# Patient Record
Sex: FEMALE | Race: WHITE | NOT HISPANIC OR LATINO | Employment: FULL TIME | URBAN - METROPOLITAN AREA
[De-identification: names, ages, dates, MRNs, and addresses within clinical notes are randomized per-mention and may not be internally consistent; named-entity substitution may affect disease eponyms.]

---

## 2017-05-11 ENCOUNTER — ALLSCRIPTS OFFICE VISIT (OUTPATIENT)
Dept: OTHER | Facility: OTHER | Age: 47
End: 2017-05-11

## 2017-05-11 ENCOUNTER — LAB REQUISITION (OUTPATIENT)
Dept: LAB | Facility: HOSPITAL | Age: 47
End: 2017-05-11
Payer: COMMERCIAL

## 2017-05-11 DIAGNOSIS — Z12.4 ENCOUNTER FOR SCREENING FOR MALIGNANT NEOPLASM OF CERVIX: ICD-10-CM

## 2017-05-11 DIAGNOSIS — Z12.31 ENCOUNTER FOR SCREENING MAMMOGRAM FOR MALIGNANT NEOPLASM OF BREAST: ICD-10-CM

## 2017-05-11 PROCEDURE — G0145 SCR C/V CYTO,THINLAYER,RESCR: HCPCS | Performed by: OBSTETRICS & GYNECOLOGY

## 2017-05-11 PROCEDURE — 87624 HPV HI-RISK TYP POOLED RSLT: CPT | Performed by: OBSTETRICS & GYNECOLOGY

## 2017-05-16 LAB — HPV RRNA GENITAL QL NAA+PROBE: NORMAL

## 2017-05-18 LAB
LAB AP GYN PRIMARY INTERPRETATION: NORMAL
Lab: NORMAL

## 2017-06-15 ENCOUNTER — HOSPITAL ENCOUNTER (OUTPATIENT)
Dept: MAMMOGRAPHY | Facility: MEDICAL CENTER | Age: 47
Discharge: HOME | End: 2017-06-15
Payer: COMMERCIAL

## 2017-06-15 DIAGNOSIS — Z12.31 ENCOUNTER FOR SCREENING MAMMOGRAM FOR MALIGNANT NEOPLASM OF BREAST: ICD-10-CM

## 2017-06-15 PROCEDURE — 77063 BREAST TOMOSYNTHESIS BI: CPT

## 2017-06-15 PROCEDURE — G0202 SCR MAMMO BI INCL CAD: HCPCS

## 2018-01-12 VITALS
HEIGHT: 62 IN | BODY MASS INDEX: 30.8 KG/M2 | SYSTOLIC BLOOD PRESSURE: 120 MMHG | WEIGHT: 167.38 LBS | DIASTOLIC BLOOD PRESSURE: 72 MMHG

## 2018-03-23 ENCOUNTER — TELEPHONE (OUTPATIENT)
Dept: OBGYN CLINIC | Facility: CLINIC | Age: 48
End: 2018-03-23

## 2018-03-23 DIAGNOSIS — E34.9 NON-MENOPAUSE HORMONAL DISORDER IN FEMALE: Primary | ICD-10-CM

## 2018-03-23 NOTE — TELEPHONE ENCOUNTER
Patient called to say that she has been really emotional lately  She is experiencing hot flashes and other menopausal symptoms  She is currently on her 2rd Mirena  She wants to know if there is blood work, etc she needs  She has her yearly exam scheduled in May

## 2019-02-26 DIAGNOSIS — N76.0 BACTERIAL VAGINOSIS: Primary | ICD-10-CM

## 2019-02-26 DIAGNOSIS — B96.89 BACTERIAL VAGINOSIS: Primary | ICD-10-CM

## 2019-02-26 RX ORDER — CLINDAMYCIN HYDROCHLORIDE 300 MG/1
300 CAPSULE ORAL 2 TIMES DAILY
Qty: 14 CAPSULE | Refills: 0 | Status: SHIPPED | OUTPATIENT
Start: 2019-02-26 | End: 2019-03-05

## 2019-02-26 NOTE — TELEPHONE ENCOUNTER
Patient called c/o BV, has had before  Her appointment was rescheduled by office until mid March  Patient is leaving to go on vacation and needs Rx now  She prefers oral Rx with no alcohol contraindication  Per Dr Donald Mejia sent for Clindamycin 300 mg tablets

## 2019-03-18 ENCOUNTER — ANNUAL EXAM (OUTPATIENT)
Dept: OBGYN CLINIC | Facility: CLINIC | Age: 49
End: 2019-03-18
Payer: COMMERCIAL

## 2019-03-18 VITALS
DIASTOLIC BLOOD PRESSURE: 84 MMHG | BODY MASS INDEX: 32.02 KG/M2 | HEIGHT: 62 IN | SYSTOLIC BLOOD PRESSURE: 112 MMHG | WEIGHT: 174 LBS

## 2019-03-18 DIAGNOSIS — N91.4 SECONDARY OLIGOMENORRHEA: ICD-10-CM

## 2019-03-18 DIAGNOSIS — Z12.31 ENCOUNTER FOR SCREENING MAMMOGRAM FOR MALIGNANT NEOPLASM OF BREAST: Primary | ICD-10-CM

## 2019-03-18 DIAGNOSIS — Z97.5 IUD (INTRAUTERINE DEVICE) IN PLACE: ICD-10-CM

## 2019-03-18 PROCEDURE — 99396 PREV VISIT EST AGE 40-64: CPT | Performed by: OBSTETRICS & GYNECOLOGY

## 2019-03-18 RX ORDER — POLYETHYLENE GLYCOL 3350 17 G/17G
POWDER, FOR SOLUTION ORAL
COMMUNITY
Start: 2014-02-12

## 2019-03-18 NOTE — PATIENT INSTRUCTIONS
Intrauterine Device   WHAT YOU NEED TO KNOW:   What is an intrauterine device? An intrauterine device (IUD) is a type of birth control that is inserted into your uterus  It is a small, flexible piece of plastic with a string on the end  It is inserted and removed by your healthcare provider  IUDs prevent sperm from reaching or fertilizing an egg  IUDs also prevent a fertilized egg from attaching to the uterus and developing into a fetus  What are the most common types of IUDs? · Copper: This type of IUD slowly releases a small amount of copper into your uterus  This IUD can remain in place for up to 10 years  · Hormone-releasing: This type of IUD slowly releases a small amount of progesterone into your uterus  Progesterone is a hormone that is made by your body to help control your periods  This IUD can remain in place for up to 5 years  What are the advantages of an IUD? · Effective: An IUD is 98% to 99% effective in preventing pregnancy  · Easily removable: The IUD can be removed if you decide to have a baby  You may be able to get pregnant as soon as the IUD is removed  · Immediate:  An IUD protects you from pregnancy right after it is inserted  · Convenient:  You do not have to stop sexual activity to insert it or worry about remembering to take your birth control pill  · Safe:  Copper IUDs are safer for some women than oral birth control pills  Examples include women who smoke or have a history of blood clots  · Health effects:  Hormone-releasing IUDs may decrease certain health problems  Examples include bleeding and cramping that happen with your monthly period  What are the risks of an IUD? · An IUD does not protect you from sexually transmitted infections  You may have cramps during the first weeks after you get the IUD  A copper IUD may cause your monthly period to be heavier or more painful  This is more common within the first 3 months after you get the IUD   You may need to have your IUD removed if your bleeding or pain becomes severe  You may have spotting between periods  · There is a small chance that you could get pregnant  Sometimes the IUD cannot be removed after you get pregnant  This increases your risk of a miscarriage or an ectopic pregnancy  Ectopic pregnancy is when the fertilized egg starts to grow somewhere other than your uterus  There is a small risk of an infection within the first 20 days after the IUD is placed  Infection can lead to pelvic inflammatory disease  This can cause infertility  Your uterus may tear when the IUD is inserted  The IUD may slip part or all of the way out of your uterus  How is the IUD inserted? · The IUD is usually inserted during your monthly period  This may help decrease the amount of discomfort you have during the procedure  It also helps ensure that you are not pregnant  You will be asked to lie down and place your feet in stirrups  Your healthcare provider will gently insert a speculum into your vagina  This is the same tool used during a pap smear  The speculum allows your healthcare provider to see inside your vagina to your cervix  The cervix is the opening of your uterus  · Your healthcare provider will clean your cervix with an antiseptic solution to prevent infection  You may be given numbing medicine  A long plastic tube is gently passed through your cervix and into your uterus  This tube has the IUD inside of it  The IUD is pushed out of the tube and into your uterus  You may have cramps as the IUD is inserted  The tube is removed after the IUD is in place  How can I make sure my IUD is still in place? An IUD has a string made of plastic thread  One to 2 inches of this string hangs into your vagina  You cannot see this string, and it will not cause problems when you have sex  Check your IUD string every 3 days for the first 3 months after it is inserted  After that, check the string after each monthly period   Do the following to check the placement of your IUD:  · Wash your hands with soap and warm water  Dry them with a clean towel  · Bend your knees and squat low to the ground  · Gently put your index finger inside your vagina  The cervix is at the top of the vagina and feels like the tip of your nose  Feel for the IUD string  Do not pull on the string  You should not be able to feel the firm plastic of the IUD itself  · Wash your hands after you check your IUD string  Where can I find more information? · [facility] [address] [place]  Phone: 0- [phone]  Web Address: https://[employer]/  org  When should I contact my healthcare provider? · You think you are pregnant  · You bleed after you have sex  · You have pain during sex  · You cannot feel the IUD string, the string feels longer, or you feel the plastic of the IUD itself  · You have vaginal discharge that is green, yellow, or has a foul odor  · You have questions or concerns about your condition or care  When should I seek immediate care? · You have severe pain or bleeding during your period  · You have a fever and severe abdominal pain  CARE AGREEMENT:   You have the right to help plan your care  Learn about your health condition and how it may be treated  Discuss treatment options with your caregivers to decide what care you want to receive  You always have the right to refuse treatment  The above information is an  only  It is not intended as medical advice for individual conditions or treatments  Talk to your doctor, nurse or pharmacist before following any medical regimen to see if it is safe and effective for you  © 2017 [employer] Information is for End User's use only and may not be sold, redistributed or otherwise used for commercial purposes   All illustrations and images included in CareNotes® are the copyrighted property of A D A M , Inc  or Orion Singh

## 2019-03-18 NOTE — PROGRESS NOTES
Assessment/Plan   Diagnoses and all orders for this visit:    Encounter for screening mammogram for malignant neoplasm of breast  -     Mammo screening bilateral w 3d & cad; Future    Other orders  -     polyethylene glycol (MIRALAX) powder; Take by mouth  -     levonorgestrel (MIRENA) 20 MCG/24HR IUD; by Intrauterine route     1  Yearly exam-Pap smear deferred, self-breast awareness reviewed, calcium/vitamin-D recommendations discussed, mammogram request given  2  Mirena IUD-status post removal with reinsertion 1/7/15  Patient is due for removal January 2020  We discussed contraceptive options  She was given the contraceptive options sheet  She is leaning towards repeat IUD at that time  She will call December 2019 and let us know which way she wishes to go  3  History of right ovarian cyst-resolved on most recent ultrasound a few years back  Exam is negative today  4  History of positive HPV-noted from Pap May 2013 with negative Pap at that time  Pap with HPV November 2014 was negative and Pap/HPV negative May 2017  Pap was deferred today as per current guidelines with patient agreement  5   History of asymmetric right breast-stable on mammogram imaging  Most recent 3D mammogram June 2017 was negative  Request for 3D mammogram was given today  She will follow-up January 2020 for IUD removal and reinsertion if she wishes to proceed in that fashion  Otherwise, follow-up 1 year for yearly exam     Subjective   Patient ID: Asaf Johnston is a 57 y o  female  Vitals:    03/18/19 0808   BP: 112/84     Patient was seen today for yearly exam   Please see assessment plan for details        The following portions of the patient's history were reviewed and updated as appropriate: allergies, current medications, past family history, past medical history, past social history, past surgical history and problem list   Past Medical History:   Diagnosis Date    Blood type, Rh negative     Fatty infiltration of liver  Human papilloma virus infection 2013    Pap negative with positive HPV     Past Surgical History:   Procedure Laterality Date     SECTION, LOW TRANSVERSE  2004    Breech presentation    OVARIAN CYST REMOVAL      REDUCTION MAMMAPLASTY      TONSILLECTOMY      TOOTH EXTRACTION       OB History    Para Term  AB Living   2 1 1   1 1   SAB TAB Ectopic Multiple Live Births           1      # Outcome Date GA Lbr Juan/2nd Weight Sex Delivery Anes PTL Lv   2 AB            1 Term               Obstetric Comments   Breech birth   Spontaneous        Current Outpatient Medications:     levonorgestrel (MIRENA) 20 MCG/24HR IUD, by Intrauterine route, Disp: , Rfl:     polyethylene glycol (MIRALAX) powder, Take by mouth, Disp: , Rfl:   No Known Allergies  Social History     Socioeconomic History    Marital status:      Spouse name: None    Number of children: 1    Years of education: None    Highest education level: None   Occupational History    None   Social Needs    Financial resource strain: None    Food insecurity:     Worry: None     Inability: None    Transportation needs:     Medical: None     Non-medical: None   Tobacco Use    Smoking status: Never Smoker    Smokeless tobacco: Never Used   Substance and Sexual Activity    Alcohol use: Yes     Comment: Socially    Drug use: No    Sexual activity: Yes     Birth control/protection: IUD   Lifestyle    Physical activity:     Days per week: None     Minutes per session: None    Stress: None   Relationships    Social connections:     Talks on phone: None     Gets together: None     Attends Confucianism service: None     Active member of club or organization: None     Attends meetings of clubs or organizations: None     Relationship status: None    Intimate partner violence:     Fear of current or ex partner: None     Emotionally abused: None     Physically abused: None     Forced sexual activity: None Other Topics Concern    None   Social History Narrative    Caffeine use    [employer] affiliation:  [employer]    Uses safety equipment - seatbelts     Family History   Problem Relation Age of Onset    Diabetes Father     Hypertension Father     Prostate cancer Family     Heart disease Family     Uterine cancer Maternal Grandmother        Review of Systems   Constitutional: Negative for chills, diaphoresis, fatigue and fever  Respiratory: Negative for apnea, cough, chest tightness, shortness of breath and wheezing  Cardiovascular: Negative for chest pain, palpitations and leg swelling  Gastrointestinal: Negative for abdominal distention, abdominal pain, anal bleeding, constipation, diarrhea, nausea, rectal pain and vomiting  Genitourinary: Negative for difficulty urinating, dyspareunia, dysuria, frequency, hematuria, menstrual problem, pelvic pain, urgency, vaginal bleeding, vaginal discharge and vaginal pain  Musculoskeletal: Negative for arthralgias, back pain and myalgias  Skin: Negative for color change and rash  Neurological: Negative for dizziness, syncope, light-headedness, numbness and headaches  Hematological: Negative for adenopathy  Does not bruise/bleed easily  Psychiatric/Behavioral: Negative for dysphoric mood and sleep disturbance  The patient is not nervous/anxious  Objective   Physical Exam    Objective      /84 (BP Location: Right arm, Patient Position: Sitting, Cuff Size: Standard)   Ht 5' 2" (1 575 m)   Wt 78 9 kg (174 lb)   BMI 31 83 kg/m²     General:   alert and oriented, in no acute distress   Neck: normal to inspection and palpation   Breast: normal appearance, no masses or tenderness   Heart:    Lungs:    Abdomen: soft, non-tender, without masses or organomegaly   Vulva: normal   Vagina: Without erythema or lesions or discharge  Normal   Cervix: Without lesions or discharge or cervicitis    No Cervical motion tenderness   Uterus: top normal size, mid-position, non-tender   Adnexa: no mass, fullness, tenderness   Rectum: negative    Psych:  Normal mood and affect   Skin:  Without obvious lesions   Eyes: symmetric, with normal movements and reactivity   Musculoskeletal:  Normal muscle tone and movements appreciated

## 2019-04-04 ENCOUNTER — TELEPHONE (OUTPATIENT)
Dept: OBGYN CLINIC | Facility: CLINIC | Age: 49
End: 2019-04-04

## 2019-05-20 ENCOUNTER — HOSPITAL ENCOUNTER (OUTPATIENT)
Dept: MAMMOGRAPHY | Facility: MEDICAL CENTER | Age: 49
Discharge: HOME | End: 2019-05-20
Payer: COMMERCIAL

## 2019-05-20 VITALS — BODY MASS INDEX: 32.02 KG/M2 | WEIGHT: 174 LBS | HEIGHT: 62 IN

## 2019-05-20 DIAGNOSIS — Z12.31 ENCOUNTER FOR SCREENING MAMMOGRAM FOR MALIGNANT NEOPLASM OF BREAST: ICD-10-CM

## 2019-05-20 PROCEDURE — 77067 SCR MAMMO BI INCL CAD: CPT

## 2019-05-20 PROCEDURE — 77063 BREAST TOMOSYNTHESIS BI: CPT

## 2019-11-01 ENCOUNTER — TELEPHONE (OUTPATIENT)
Dept: OBGYN CLINIC | Facility: CLINIC | Age: 49
End: 2019-11-01

## 2019-11-01 NOTE — TELEPHONE ENCOUNTER
Agree, Toradol as a good idea  Toradol 10 mg tablets, #2 to take p o  X1 about 1-2 hours prior to the procedure with repeat in 6 hours as needed

## 2019-11-04 DIAGNOSIS — G89.18 PAIN ASSOCIATED WITH SURGICAL PROCEDURE: Primary | ICD-10-CM

## 2019-11-04 RX ORDER — KETOROLAC TROMETHAMINE 10 MG/1
10 TABLET, FILM COATED ORAL EVERY 6 HOURS PRN
Qty: 2 TABLET | Refills: 0 | Status: SHIPPED | OUTPATIENT
Start: 2019-11-04 | End: 2020-12-01 | Stop reason: ALTCHOICE

## 2019-11-05 ENCOUNTER — DOCUMENTATION (OUTPATIENT)
Dept: OBGYN CLINIC | Facility: CLINIC | Age: 49
End: 2019-11-05

## 2019-11-05 NOTE — PROGRESS NOTES
Patient is scheduled for Mirena IUD insertion/removel on 01/02/20  I called her insurance and both procedures are covered a 100% - zero copay   Spoke to [facility] # [id number]

## 2020-01-02 ENCOUNTER — ULTRASOUND (OUTPATIENT)
Dept: OBGYN CLINIC | Facility: CLINIC | Age: 50
End: 2020-01-02
Payer: COMMERCIAL

## 2020-01-02 ENCOUNTER — PROCEDURE VISIT (OUTPATIENT)
Dept: OBGYN CLINIC | Facility: CLINIC | Age: 50
End: 2020-01-02
Payer: COMMERCIAL

## 2020-01-02 VITALS
SYSTOLIC BLOOD PRESSURE: 128 MMHG | DIASTOLIC BLOOD PRESSURE: 84 MMHG | WEIGHT: 182.2 LBS | HEIGHT: 62 IN | BODY MASS INDEX: 33.53 KG/M2

## 2020-01-02 DIAGNOSIS — Z30.431 IUD CHECK UP: Primary | ICD-10-CM

## 2020-01-02 DIAGNOSIS — Z30.432 ENCOUNTER FOR IUD REMOVAL: ICD-10-CM

## 2020-01-02 DIAGNOSIS — Z97.5 IUD (INTRAUTERINE DEVICE) IN PLACE: ICD-10-CM

## 2020-01-02 DIAGNOSIS — Z30.430 ENCOUNTER FOR INSERTION OF MIRENA IUD: Primary | ICD-10-CM

## 2020-01-02 PROCEDURE — 58301 REMOVE INTRAUTERINE DEVICE: CPT | Performed by: OBSTETRICS & GYNECOLOGY

## 2020-01-02 PROCEDURE — 58300 INSERT INTRAUTERINE DEVICE: CPT | Performed by: OBSTETRICS & GYNECOLOGY

## 2020-01-02 PROCEDURE — 76830 TRANSVAGINAL US NON-OB: CPT | Performed by: OBSTETRICS & GYNECOLOGY

## 2020-01-02 NOTE — PROGRESS NOTES
AMB US Pelvic Non OB  Date/Time: 1/2/2020 8:33 AM  Performed by: Jarod Tony  Authorized by: Richy Lopes MD     Procedure details:     Technique:  Transvaginal US, Non-OB    Position: lithotomy exam    Uterine findings:     Length (cm): 11 04    Height (cm):  4 18    Width (cm):  5 27    Endometrial stripe: identified      Endometrium thickness (mm):  2 7  Left ovary findings:     Left ovary:  Visualized    Length (cm): 2 64    Height (cm): 1 37    Width (cm): 1 6  Right ovary findings:     Right ovary:  Visualized    Length (cm): 2 33    Height (cm): 1 93    Width (cm): 1 93  Other findings:     Free pelvic fluid: not identified      Free peritoneal fluid: not identified    Post-Procedure Details:     Impression:  Anteflexed uterus demonstrates an IUD low within the upper cervix, lower uterine segment with the superior tip 6 87cm into the uterus, otherwise uterus and bilateral ovaries appear within normal limits  Ultrasound was performed both transabdominally and transvaginally  Transvaginal images only demonstrate a portion of the lower cervix  Tolerance: Tolerated well, no immediate complications    Complications: no complications    Additional Procedure Comments:      [employer] F8 E8C-RS transvaginal transducer Serial #[id number] was used to perform the examination today and subsequently followed with high level disinfection utilizing Trophon EPR procedure  Ultrasound performed at:     [id number] [address] [address] [facility]  Phone:  [phone]  Fax:  [phone]

## 2020-01-02 NOTE — PROGRESS NOTES
Assessment/Plan   Diagnoses and all orders for this visit:    Encounter for insertion of mirena IUD  -     levonorgestrel (MIRENA) IUD 20 mcg/day  -     Iud insertions    IUD (intrauterine device) in place    Encounter for IUD removal  -     Iud removal     1  Mirena IUD removal with reinsertion  Removal was done without issues  Reinsertion was difficult due to the patient's anatomy and some angulation at the cervix  Uterus sounded to 10 cm and the 1st attempt of IUD insertion was unsuccessful  The 2nd attempt was also unsuccessful but the 3rd attempt with the 2nd IUD was successful  Ultrasound was done, with IUD in the lower uterine segment noted  Patient was counseled about this  IUD string was cut to a length of 3 cm  She will call with any severe cramping or heavy bleeding  She will follow-up in 3-4 weeks time for ultrasound and exam with me  2  Previous history of right ovarian cyst-none noted on ultrasound today  3  Previous history of HPV -most recent Pap/HPV negative May 2017  To follow-up on/around May 2020 for repeat testing  4  History of asymmetric right breast-most recent mammogram was stable from 19  Follow-up mammogram May 2020  Follow-up 3-4 weeks for ultrasound and exam with me to check IUD  Subjective   Patient ID: Laymon Bernheim is a 54 y o  female      Vitals:    20 0806   BP: 128/84     HPI    The following portions of the patient's history were reviewed and updated as appropriate: allergies, current medications, past family history, past medical history, past social history, past surgical history and problem list   Past Medical History:   Diagnosis Date    Blood type, Rh negative     Fatty infiltration of liver     Human papilloma virus infection 2013    Pap negative with positive HPV     Past Surgical History:   Procedure Laterality Date     SECTION, LOW TRANSVERSE  2004    Breech presentation    OVARIAN CYST REMOVAL      REDUCTION MAMMAPLASTY Bilateral 2006    TONSILLECTOMY      TOOTH EXTRACTION       OB History    Para Term  AB Living   2 1 1   1 1   SAB TAB Ectopic Multiple Live Births           1      # Outcome Date GA Lbr [place]/2nd Weight Sex Delivery Anes PTL Lv   2 AB            1 Term               Obstetric Comments   Breech birth   Spontaneous        Current Outpatient Medications:     ketorolac (TORADOL) 10 mg tablet, Take 1 tablet (10 mg total) by mouth every 6 (six) hours as needed for moderate pain (Take 1 tablet 1-2 hours prior to procedure and then 6 hours later is needed), Disp: 2 tablet, Rfl: 0    levonorgestrel (MIRENA) 20 MCG/24HR IUD, by Intrauterine route, Disp: , Rfl:     polyethylene glycol (MIRALAX) powder, Take by mouth, Disp: , Rfl:   No Known Allergies  Social History     Socioeconomic History    Marital status:      Spouse name: None    Number of children: 1    Years of education: None    Highest education level: None   Occupational History    None   Social Needs    Financial resource strain: None    Food insecurity:     Worry: None     Inability: None    Transportation needs:     Medical: None     Non-medical: None   Tobacco Use    Smoking status: Never Smoker    Smokeless tobacco: Never Used   Substance and Sexual Activity    Alcohol use: Yes     Comment: Socially    Drug use: No    Sexual activity: Yes     Birth control/protection: IUD   Lifestyle    Physical activity:     Days per week: None     Minutes per session: None    Stress: None   Relationships    Social connections:     Talks on phone: None     Gets together: None     Attends Worship service: None     Active member of club or organization: None     Attends meetings of clubs or organizations: None     Relationship status: None    Intimate partner violence:     Fear of current or ex partner: None     Emotionally abused: None     Physically abused: None     Forced sexual activity: None   Other Topics Concern    None   Social History Narrative    Caffeine use    [employer] affiliation:  [facility]    Uses safety equipment - seatbelts     Family History   Problem Relation Age of Onset    Diabetes Father     Hypertension Father     Prostate cancer Father     Prostate cancer Family     Heart disease Family     Uterine cancer Maternal Grandmother     Melanoma Maternal Aunt     No Known Problems Maternal Grandfather     No Known Problems Paternal Grandmother     Prostate cancer Paternal Grandfather        Review of Systems    Objective   Physical Exam    Objective      /84 (BP Location: Left arm, Patient Position: Sitting, Cuff Size: Large)   Ht 5' 2" (1 575 m)   Wt 82 6 kg (182 lb 3 2 oz)   LMP  (LMP Unknown)   BMI 33 32 kg/m²     General:   alert and oriented, in no acute distress   Neck:    Breast:    Heart:    Lungs:    Abdomen: soft, non-tender, without masses or organomegaly   Vulva: normal   Vagina: Without erythema or lesions or discharge  Normal   Cervix: Without lesions or discharge or cervicitis    No Cervical motion tenderness   Uterus: top normal size, mid-position, non-tender   Adnexa: no mass, fullness, tenderness   Rectum: deferred    Psych:  Normal mood and affect   Skin:  Without obvious lesions   Eyes: symmetric, with normal movements and reactivity   Musculoskeletal:  Normal muscle tone and movements appreciated

## 2020-01-02 NOTE — PROGRESS NOTES
Iud removal  Date/Time: 1/2/2020 8:13 AM  Performed by: Peter Kumar MD  Authorized by: Peter Kumar MD     Consent:     Consent obtained:  Verbal and written    Consent given by:  Patient    Procedure risks and benefits discussed: yes      Patient questions answered: yes      Patient agrees, verbalizes understanding, and wants to proceed: yes      Educational handouts given: yes      Instructions and paperwork completed: yes    Procedure:     Removed with no complications: yes      Other reason for removal:  IUD expiring  Comments:      Patient tolerated well without issues

## 2020-01-02 NOTE — PATIENT INSTRUCTIONS
Intrauterine Device   WHAT YOU NEED TO KNOW:   What is an intrauterine device? An intrauterine device (IUD) is a type of birth control that is inserted into your uterus  It is a small, flexible piece of plastic with a string on the end  It is inserted and removed by your healthcare provider  IUDs prevent sperm from reaching or fertilizing an egg  IUDs also prevent a fertilized egg from attaching to the uterus and developing into a fetus  What are the most common types of IUDs? · Copper: This type of IUD slowly releases a small amount of copper into your uterus  This IUD can remain in place for up to 10 years  · Hormone-releasing: This type of IUD slowly releases a small amount of progesterone into your uterus  Progesterone is a hormone that is made by your body to help control your periods  This IUD can remain in place for up to 5 years  What are the advantages of an IUD? · Effective: An IUD is 98% to 99% effective in preventing pregnancy  · Easily removable: The IUD can be removed if you decide to have a baby  You may be able to get pregnant as soon as the IUD is removed  · Immediate:  An IUD protects you from pregnancy right after it is inserted  · Convenient:  You do not have to stop sexual activity to insert it or worry about remembering to take your birth control pill  · Safe:  Copper IUDs are safer for some women than oral birth control pills  Examples include women who smoke or have a history of blood clots  · Health effects:  Hormone-releasing IUDs may decrease certain health problems  Examples include bleeding and cramping that happen with your monthly period  What are the risks of an IUD? · An IUD does not protect you from sexually transmitted infections  You may have cramps during the first weeks after you get the IUD  A copper IUD may cause your monthly period to be heavier or more painful  This is more common within the first 3 months after you get the IUD   You may need to have your IUD removed if your bleeding or pain becomes severe  You may have spotting between periods  · There is a small chance that you could get pregnant  Sometimes the IUD cannot be removed after you get pregnant  This increases your risk of a miscarriage or an ectopic pregnancy  Ectopic pregnancy is when the fertilized egg starts to grow somewhere other than your uterus  There is a small risk of an infection within the first 20 days after the IUD is placed  Infection can lead to pelvic inflammatory disease  This can cause infertility  Your uterus may tear when the IUD is inserted  The IUD may slip part or all of the way out of your uterus  How is the IUD inserted? · The IUD is usually inserted during your monthly period  This may help decrease the amount of discomfort you have during the procedure  It also helps ensure that you are not pregnant  You will be asked to lie down and place your feet in stirrups  Your healthcare provider will gently insert a speculum into your vagina  This is the same tool used during a pap smear  The speculum allows your healthcare provider to see inside your vagina to your cervix  The cervix is the opening of your uterus  · Your healthcare provider will clean your cervix with an antiseptic solution to prevent infection  You may be given numbing medicine  A long plastic tube is gently passed through your cervix and into your uterus  This tube has the IUD inside of it  The IUD is pushed out of the tube and into your uterus  You may have cramps as the IUD is inserted  The tube is removed after the IUD is in place  How can I make sure my IUD is still in place? An IUD has a string made of plastic thread  One to 2 inches of this string hangs into your vagina  You cannot see this string, and it will not cause problems when you have sex  Check your IUD string every 3 days for the first 3 months after it is inserted  After that, check the string after each monthly period   Do the following to check the placement of your IUD:  · Wash your hands with soap and warm water  Dry them with a clean towel  · Bend your knees and squat low to the ground  · Gently put your index finger inside your vagina  The cervix is at the top of the vagina and feels like the tip of your nose  Feel for the IUD string  Do not pull on the string  You should not be able to feel the firm plastic of the IUD itself  · Wash your hands after you check your IUD string  Where can I find more information? · [facility] [address] [place]  Phone: [phone]  Web Address: https://[email or web address]/  org  When should I contact my healthcare provider? · You think you are pregnant  · You bleed after you have sex  · You have pain during sex  · You cannot feel the IUD string, the string feels longer, or you feel the plastic of the IUD itself  · You have vaginal discharge that is green, yellow, or has a foul odor  · You have questions or concerns about your condition or care  When should I seek immediate care? · You have severe pain or bleeding during your period  · You have a fever and severe abdominal pain  CARE AGREEMENT:   You have the right to help plan your care  Learn about your health condition and how it may be treated  Discuss treatment options with your caregivers to decide what care you want to receive  You always have the right to refuse treatment  The above information is an  only  It is not intended as medical advice for individual conditions or treatments  Talk to your doctor, nurse or pharmacist before following any medical regimen to see if it is safe and effective for you  © 2017 [employer] Information is for End User's use only and may not be sold, redistributed or otherwise used for commercial purposes   All illustrations and images included in CareNotes® are the copyrighted property of A D A M , Inc  or [employer]

## 2020-01-02 NOTE — PROGRESS NOTES
Iud insertions  Date/Time: 1/2/2020 8:43 AM  Performed by: Richy Lopes MD  Authorized by: Richy Lopes MD     Consent:     Consent obtained:  Verbal and written    Consent given by:  Patient    Procedure risks and benefits discussed: yes      Patient questions answered: yes      Patient agrees, verbalizes understanding, and wants to proceed: yes      Educational handouts given: yes      Instructions and paperwork completed: yes    Procedure:     Pelvic exam performed: yes      Cervix cleaned and prepped: yes      Speculum placed in vagina: yes      Tenaculum applied to cervix: yes      Uterus sounded: yes      Uterus sound depth (cm):  10    IUD inserted with no complications: no      IUD type:  Mirena    Strings trimmed: yes    Post-procedure:     Patient tolerated procedure well: no      Patient will follow up after next period: yes    Comments:      Significant difficulty with IUD insertion  Uterus sounded to 10 cm  First IUD was placed, but the device came out with the insertion materials  Patient did jump during this insertion attempt  I was unable to feed the IUD back into the insertion material   A 2nd IUD was placed with no significant issues  Patient tolerated this well

## 2020-01-23 ENCOUNTER — OFFICE VISIT (OUTPATIENT)
Dept: OBGYN CLINIC | Facility: CLINIC | Age: 50
End: 2020-01-23
Payer: COMMERCIAL

## 2020-01-23 ENCOUNTER — ULTRASOUND (OUTPATIENT)
Dept: OBGYN CLINIC | Facility: CLINIC | Age: 50
End: 2020-01-23
Payer: COMMERCIAL

## 2020-01-23 VITALS
HEIGHT: 62 IN | BODY MASS INDEX: 33.38 KG/M2 | WEIGHT: 181.4 LBS | DIASTOLIC BLOOD PRESSURE: 82 MMHG | SYSTOLIC BLOOD PRESSURE: 130 MMHG

## 2020-01-23 DIAGNOSIS — N92.6 IRREGULAR MENSTRUAL CYCLE: ICD-10-CM

## 2020-01-23 DIAGNOSIS — Z30.431 ENCOUNTER FOR ROUTINE CHECKING OF INTRAUTERINE CONTRACEPTIVE DEVICE (IUD): Primary | ICD-10-CM

## 2020-01-23 DIAGNOSIS — Z97.5 IUD (INTRAUTERINE DEVICE) IN PLACE: ICD-10-CM

## 2020-01-23 DIAGNOSIS — N91.4 SECONDARY OLIGOMENORRHEA: Primary | ICD-10-CM

## 2020-01-23 DIAGNOSIS — Z30.09 BIRTH CONTROL COUNSELING: ICD-10-CM

## 2020-01-23 PROCEDURE — 99214 OFFICE O/P EST MOD 30 MIN: CPT | Performed by: OBSTETRICS & GYNECOLOGY

## 2020-01-23 PROCEDURE — 76856 US EXAM PELVIC COMPLETE: CPT | Performed by: OBSTETRICS & GYNECOLOGY

## 2020-01-23 NOTE — PATIENT INSTRUCTIONS
Intrauterine Device   WHAT YOU NEED TO KNOW:   What is an intrauterine device? An intrauterine device (IUD) is a type of birth control that is inserted into your uterus  It is a small, flexible piece of plastic with a string on the end  It is inserted and removed by your healthcare provider  IUDs prevent sperm from reaching or fertilizing an egg  IUDs also prevent a fertilized egg from attaching to the uterus and developing into a fetus  What are the most common types of IUDs? · Copper: This type of IUD slowly releases a small amount of copper into your uterus  This IUD can remain in place for up to 10 years  · Hormone-releasing: This type of IUD slowly releases a small amount of progesterone into your uterus  Progesterone is a hormone that is made by your body to help control your periods  This IUD can remain in place for up to 5 years  What are the advantages of an IUD? · Effective: An IUD is 98% to 99% effective in preventing pregnancy  · Easily removable: The IUD can be removed if you decide to have a baby  You may be able to get pregnant as soon as the IUD is removed  · Immediate:  An IUD protects you from pregnancy right after it is inserted  · Convenient:  You do not have to stop sexual activity to insert it or worry about remembering to take your birth control pill  · Safe:  Copper IUDs are safer for some women than oral birth control pills  Examples include women who smoke or have a history of blood clots  · Health effects:  Hormone-releasing IUDs may decrease certain health problems  Examples include bleeding and cramping that happen with your monthly period  What are the risks of an IUD? · An IUD does not protect you from sexually transmitted infections  You may have cramps during the first weeks after you get the IUD  A copper IUD may cause your monthly period to be heavier or more painful  This is more common within the first 3 months after you get the IUD   You may need to have your IUD removed if your bleeding or pain becomes severe  You may have spotting between periods  · There is a small chance that you could get pregnant  Sometimes the IUD cannot be removed after you get pregnant  This increases your risk of a miscarriage or an ectopic pregnancy  Ectopic pregnancy is when the fertilized egg starts to grow somewhere other than your uterus  There is a small risk of an infection within the first 20 days after the IUD is placed  Infection can lead to pelvic inflammatory disease  This can cause infertility  Your uterus may tear when the IUD is inserted  The IUD may slip part or all of the way out of your uterus  How is the IUD inserted? · The IUD is usually inserted during your monthly period  This may help decrease the amount of discomfort you have during the procedure  It also helps ensure that you are not pregnant  You will be asked to lie down and place your feet in stirrups  Your healthcare provider will gently insert a speculum into your vagina  This is the same tool used during a pap smear  The speculum allows your healthcare provider to see inside your vagina to your cervix  The cervix is the opening of your uterus  · Your healthcare provider will clean your cervix with an antiseptic solution to prevent infection  You may be given numbing medicine  A long plastic tube is gently passed through your cervix and into your uterus  This tube has the IUD inside of it  The IUD is pushed out of the tube and into your uterus  You may have cramps as the IUD is inserted  The tube is removed after the IUD is in place  How can I make sure my IUD is still in place? An IUD has a string made of plastic thread  One to 2 inches of this string hangs into your vagina  You cannot see this string, and it will not cause problems when you have sex  Check your IUD string every 3 days for the first 3 months after it is inserted  After that, check the string after each monthly period   Do the following to check the placement of your IUD:  · Wash your hands with soap and warm water  Dry them with a clean towel  · Bend your knees and squat low to the ground  · Gently put your index finger inside your vagina  The cervix is at the top of the vagina and feels like the tip of your nose  Feel for the IUD string  Do not pull on the string  You should not be able to feel the firm plastic of the IUD itself  · Wash your hands after you check your IUD string  Where can I find more information? · [facility] [address] , [address] [place]  Phone: [phone]  Web Address: https://[employer]/  org  When should I contact my healthcare provider? · You think you are pregnant  · You bleed after you have sex  · You have pain during sex  · You cannot feel the IUD string, the string feels longer, or you feel the plastic of the IUD itself  · You have vaginal discharge that is green, yellow, or has a foul odor  · You have questions or concerns about your condition or care  When should I seek immediate care? · You have severe pain or bleeding during your period  · You have a fever and severe abdominal pain  CARE AGREEMENT:   You have the right to help plan your care  Learn about your health condition and how it may be treated  Discuss treatment options with your caregivers to decide what care you want to receive  You always have the right to refuse treatment  The above information is an  only  It is not intended as medical advice for individual conditions or treatments  Talk to your doctor, nurse or pharmacist before following any medical regimen to see if it is safe and effective for you  © 2017 [employer] Information is for End User's use only and may not be sold, redistributed or otherwise used for commercial purposes   All illustrations and images included in CareNotes® are the copyrighted property of A D A M , Inc  or Orion Singh

## 2020-01-23 NOTE — PROGRESS NOTES
Assessment/Plan   Diagnoses and all orders for this visit:    Secondary oligomenorrhea    IUD (intrauterine device) in place    Birth control counseling    Irregular menstrual cycle     1  Mirena IUD check-removal with reinsertion was done at last visit on 1/2/20  Required 3rd attempt with 2nd IUD for successful insertion  Ultrasound postprocedure demonstrated the IUD in the lower uterine segment  Exam today with IUD string approximately 2 5 cm  Ultrasound demonstrates continued presence of the IUD in the lower uterine segment  Patient and partner were counseled about the findings  She did note about 4 5 days of bleeding with some cramps after IUD insertion then resolution of cramping and only some 2-3 days of spotting a few weeks later  She was counseled about the possible decreased affective this of the IUD for contraception being is in the lower uterine segment  However, the Mirena does have hormones and given this, the barrier effect of the IUD, and her age of 54, her likelihood of pregnancy is quite low  Could consider using condoms as well  Patient inquired about other hormonal contraception such as OCP and she could consider this as well  She will observe and see how she does on the IUD  To follow-up in the next few months for yearly exam with Pap/HPV and we can reassess at that time  2  Previous history of right ovarian cyst-none noted on ultrasound today  3  Previous history HPV-most recent Pap/HPV negative May 2017  Would recommend follow-up Pap/HPV May 2020  4  Other-some kinking is noted on the cervix, which was appreciated at the time of IUD insertion and also visualized on ultrasound  This is likely a spontaneous anatomical finding  This likely contributed to difficulty with IUD placement  Subjective   Patient ID: Harris Richardson is a 54 y o  female  Vitals:    01/23/20 0746   BP: 130/82     Patient was seen today for follow-up visit    Please see assessment plan for details        The following portions of the patient's history were reviewed and updated as appropriate: allergies, current medications, past family history, past medical history, past social history, past surgical history and problem list   Past Medical History:   Diagnosis Date    Blood type, Rh negative     Fatty infiltration of liver     Human papilloma virus infection 2013    Pap negative with positive HPV     Past Surgical History:   Procedure Laterality Date     SECTION, LOW TRANSVERSE  2004    Breech presentation    OVARIAN CYST REMOVAL      REDUCTION MAMMAPLASTY Bilateral 2006    TONSILLECTOMY      TOOTH EXTRACTION       OB History    Para Term  AB Living   2 1 1   1 1   SAB TAB Ectopic Multiple Live Births           1      # Outcome Date GA Lbr Juan/2nd Weight Sex Delivery Anes PTL Lv   2 AB            1 Term               Obstetric Comments   Breech birth   Spontaneous        Current Outpatient Medications:     ketorolac (TORADOL) 10 mg tablet, Take 1 tablet (10 mg total) by mouth every 6 (six) hours as needed for moderate pain (Take 1 tablet 1-2 hours prior to procedure and then 6 hours later is needed), Disp: 2 tablet, Rfl: 0    levonorgestrel (MIRENA) 20 MCG/24HR IUD, by Intrauterine route, Disp: , Rfl:     polyethylene glycol (MIRALAX) powder, Take by mouth, Disp: , Rfl:   No Known Allergies  Social History     Socioeconomic History    Marital status:      Spouse name: None    Number of children: 1    Years of education: None    Highest education level: None   Occupational History    None   Social Needs    Financial resource strain: None    Food insecurity:     Worry: None     Inability: None    Transportation needs:     Medical: None     Non-medical: None   Tobacco Use    Smoking status: Never Smoker    Smokeless tobacco: Never Used   Substance and Sexual Activity    Alcohol use: Yes     Comment: Socially    Drug use: No    Sexual activity: Yes     Birth control/protection: IUD   Lifestyle    Physical activity:     Days per week: None     Minutes per session: None    Stress: None   Relationships    Social connections:     Talks on phone: None     Gets together: None     Attends Sabianism service: None     Active member of club or organization: None     Attends meetings of clubs or organizations: None     Relationship status: None    Intimate partner violence:     Fear of current or ex partner: None     Emotionally abused: None     Physically abused: None     Forced sexual activity: None   Other Topics Concern    None   Social History Narrative    Caffeine use    Buddhist affiliation:  [facility]    Uses safety equipment - seatbelts     Family History   Problem Relation Age of Onset    Diabetes Father     Hypertension Father     Prostate cancer Father     Prostate cancer Family     Heart disease Family     Uterine cancer Maternal Grandmother     Melanoma Maternal Aunt     No Known Problems Maternal Grandfather     No Known Problems Paternal Grandmother     Prostate cancer Paternal Grandfather        Review of Systems   Constitutional: Negative for chills, diaphoresis, fatigue and fever  Respiratory: Negative for apnea, cough, chest tightness, shortness of breath and wheezing  Cardiovascular: Negative for chest pain, palpitations and leg swelling  Gastrointestinal: Negative for abdominal distention, abdominal pain, anal bleeding, constipation, diarrhea, nausea, rectal pain and vomiting  Genitourinary: Negative for difficulty urinating, dyspareunia, dysuria, frequency, hematuria, menstrual problem, pelvic pain, urgency, vaginal bleeding, vaginal discharge and vaginal pain  Musculoskeletal: Negative for arthralgias, back pain and myalgias  Skin: Negative for color change and rash  Neurological: Negative for dizziness, syncope, light-headedness, numbness and headaches     Hematological: Negative for adenopathy  Does not bruise/bleed easily  Psychiatric/Behavioral: Negative for dysphoric mood and sleep disturbance  The patient is not nervous/anxious  Mild cramping    Objective   Physical Exam    Objective      /82 (BP Location: Left arm, Patient Position: Sitting, Cuff Size: Large)   Ht 5' 2" (1 575 m)   Wt 82 3 kg (181 lb 6 4 oz)   LMP  (LMP Unknown)   BMI 33 18 kg/m²     General:   alert and oriented, in no acute distress   Neck:    Breast:    Heart:    Lungs:    Abdomen: Soft, nontender, without masses   Vulva: normal   Vagina: Without erythema or lesions or discharge  Cervix: Without lesions or discharge or cervicitis  No Cervical motion tenderness  IUD string seen at a length of 2 5 cm      Uterus: top normal size, anteverted, non-tender   Adnexa: no mass, fullness, tenderness   Rectum: deferred    Psych:  Normal mood and affect   Skin:  Without obvious lesions   Eyes: symmetric, with normal movements and reactivity   Musculoskeletal:  Normal muscle tone and movements appreciated

## 2020-01-23 NOTE — PROGRESS NOTES
AMB US Pelvic Non OB  Date/Time: 1/23/2020 8:22 AM  Performed by: Donn Stafford  Authorized by: Vivienne Meckel, MD     Procedure details:     Technique:  US Pelvic, Non-OB with complete exam    Position: supine exam    Uterine findings:     Length (cm): 11 8    Height (cm):  3 12    Width (cm):  5 39    Endometrial stripe: identified      Endometrium thickness (mm):  2 3  Left ovary findings:     Left ovary:  Visualized    Length (cm): 2 57    Height (cm): 1 24    Width (cm): 1 37  Right ovary findings:     Right ovary:  Visualized    Length (cm): 2 59    Height (cm): 1 72    Width (cm): 1 97  Other findings:     Free pelvic fluid: not identified      Free peritoneal fluid: not identified    Post-Procedure Details:     Impression:  Anteflexed uterus demonstrates an IUD low within in the uterus  3 6cm from endometrial fundus with the superior tip 5cm into the uterus  Otherwise, the uterus and bilateral ovaries appear within normal limits  Transabdominal ultrasound only was performed due to anteflexion of the uterus and better visualization  Tolerance: Tolerated well, no immediate complications    Complications: no complications    Additional Procedure Comments:      Ultrasound performed at:     [id number] [facility] [address]  Phone:  [phone]  Fax:  [phone]

## 2020-03-25 ENCOUNTER — TELEPHONE (OUTPATIENT)
Dept: OBGYN CLINIC | Facility: CLINIC | Age: 50
End: 2020-03-25

## 2020-11-30 ENCOUNTER — TELEPHONE (OUTPATIENT)
Dept: OBGYN CLINIC | Facility: CLINIC | Age: 50
End: 2020-11-30

## 2020-12-01 ENCOUNTER — OFFICE VISIT (OUTPATIENT)
Dept: OBGYN CLINIC | Facility: CLINIC | Age: 50
End: 2020-12-01
Payer: COMMERCIAL

## 2020-12-01 VITALS
BODY MASS INDEX: 33.68 KG/M2 | WEIGHT: 183 LBS | SYSTOLIC BLOOD PRESSURE: 122 MMHG | HEIGHT: 62 IN | DIASTOLIC BLOOD PRESSURE: 78 MMHG

## 2020-12-01 DIAGNOSIS — Z30.09 BIRTH CONTROL COUNSELING: ICD-10-CM

## 2020-12-01 DIAGNOSIS — Z97.5 IUD (INTRAUTERINE DEVICE) IN PLACE: ICD-10-CM

## 2020-12-01 DIAGNOSIS — N92.6 IRREGULAR MENSTRUAL CYCLE: Primary | ICD-10-CM

## 2020-12-01 PROCEDURE — 99213 OFFICE O/P EST LOW 20 MIN: CPT | Performed by: OBSTETRICS & GYNECOLOGY

## 2021-08-12 ENCOUNTER — TELEPHONE (OUTPATIENT)
Dept: OBGYN CLINIC | Facility: CLINIC | Age: 51
End: 2021-08-12

## 2021-08-13 ENCOUNTER — OFFICE VISIT (OUTPATIENT)
Dept: OBGYN CLINIC | Facility: CLINIC | Age: 51
End: 2021-08-13
Payer: COMMERCIAL

## 2021-08-13 VITALS — SYSTOLIC BLOOD PRESSURE: 122 MMHG | DIASTOLIC BLOOD PRESSURE: 82 MMHG | BODY MASS INDEX: 33.65 KG/M2 | WEIGHT: 184 LBS

## 2021-08-13 DIAGNOSIS — N63.10 BREAST MASS, RIGHT: Primary | ICD-10-CM

## 2021-08-13 PROCEDURE — 99213 OFFICE O/P EST LOW 20 MIN: CPT | Performed by: OBSTETRICS & GYNECOLOGY

## 2021-08-13 NOTE — PROGRESS NOTES
Assessment/Plan   Diagnoses and all orders for this visit:    Breast mass, right  -     Mammo diagnostic bilateral w 3d & cad; Future  -     US breast right limited (diagnostic); Future    1  Right breast finding-patient with possible fullness noted at the right breast   Exam is notable for fibrocystic changes with increased prominence noted at right breast at around 0230 near the sternal border as compared with the left breast   She did have a girlfriend recently diet breast cancer at age 54, my support was given  She is concerned about this  Bilateral diagnostic mammogram along with right breast ultrasound was ordered  She will follow-up in 1 month time for breast check  2  Previous Mirena IUD removal with reinsertion-done 1/2/20 with difficult insertion noted  Ultrasound x2 after this with IUD in the lower uterine segment  Patient states it is in similar position on self exam   She does note irregular bleeding but not significant menometrorrhagia  To recheck at next visit  3  Prior history ovarian cyst-to check at next visit  4  Prior history of HPV-most recent Pap/HPV negative May 2017  To repeat Pap at next visit  5  Other-cervix is high in the vagina  Consider misoprostol if any future IUD insertion required  Follow-up 1 month for yearly exam with breast check  Subjective   Patient ID: Bonilla Polo is a 52 y o  female  Vitals:    08/13/21 0947   BP: 122/82     Patient was seen today for breast check  Please see assessment plan for details        The following portions of the patient's history were reviewed and updated as appropriate: allergies, current medications, past family history, past medical history, past social history, past surgical history and problem list   Past Medical History:   Diagnosis Date    Blood type, Rh negative     Fatty infiltration of liver     Human papilloma virus infection 05/02/2013    Pap negative with positive HPV     Past Surgical History:   Procedure Laterality Date     SECTION, LOW TRANSVERSE  2004    Breech presentation    OVARIAN CYST REMOVAL      REDUCTION MAMMAPLASTY Bilateral 2006    TONSILLECTOMY      TOOTH EXTRACTION       OB History    Para Term  AB Living   2 1 1   1 1   SAB TAB Ectopic Multiple Live Births           1      # Outcome Date GA Lbr Juan/2nd Weight Sex Delivery Anes PTL Lv   2 AB            1 Term               Obstetric Comments   Breech birth   Spontaneous        Current Outpatient Medications:     levonorgestrel (MIRENA) 20 MCG/24HR IUD, by Intrauterine route, Disp: , Rfl:     polyethylene glycol (MIRALAX) powder, Take by mouth (Patient not taking: Reported on 2021), Disp: , Rfl:   No Known Allergies  Social History     Socioeconomic History    Marital status:      Spouse name: None    Number of children: 1    Years of education: None    Highest education level: None   Occupational History    None   Tobacco Use    Smoking status: Never Smoker    Smokeless tobacco: Never Used   Vaping Use    Vaping Use: Never used   Substance and Sexual Activity    Alcohol use: Yes     Comment: Socially    Drug use: No    Sexual activity: Yes     Partners: Male     Birth control/protection: I U D  Other Topics Concern    None   Social History Narrative    Caffeine use    [employer] affiliation:  [facility] [id number] safety equipment - seatbelts     Social Determinants of Health     Financial Resource Strain:     Difficulty of Paying Living Expenses:    Food Insecurity:     Worried About Running Out of Food in the Last Year:     [address] in the Last Year:    Transportation Needs:     Lack of Transportation (Medical):      Lack of Transportation (Non-Medical):    Physical Activity:     Days of Exercise per Week:     Minutes of Exercise per Session:    Stress:     Feeling of Stress :    Social Connections:     Frequency of Communication with Friends and Family:  Frequency of Social Gatherings with Friends and Family:     Attends [employer] Services:     Active Member of Clubs or Organizations:     Attends Club or Organization Meetings:     Marital Status:    Intimate Partner Violence:     Fear of Current or Ex-Partner:     Emotionally Abused:     Physically Abused:     Sexually Abused:      Family History   Problem Relation Age of Onset    Diabetes Father     Hypertension Father     Prostate cancer Father     Prostate cancer Family     Heart disease Family     Uterine cancer Maternal Grandmother     Melanoma Maternal Aunt     No Known Problems Maternal Grandfather     No Known Problems Paternal Grandmother     Prostate cancer Paternal Grandfather        Review of Systems   Constitutional: Negative for chills, diaphoresis, fatigue and fever  Respiratory: Negative for apnea, cough, chest tightness, shortness of breath and wheezing  Cardiovascular: Negative for chest pain, palpitations and leg swelling  Gastrointestinal: Negative for abdominal distention, abdominal pain, anal bleeding, constipation, diarrhea, nausea, rectal pain and vomiting  Genitourinary: Negative for difficulty urinating, dyspareunia, dysuria, frequency, hematuria, menstrual problem, pelvic pain, urgency, vaginal bleeding, vaginal discharge and vaginal pain  Musculoskeletal: Negative for arthralgias, back pain and myalgias  Skin: Negative for color change and rash  Neurological: Negative for dizziness, syncope, light-headedness, numbness and headaches  Hematological: Negative for adenopathy  Does not bruise/bleed easily  Psychiatric/Behavioral: Negative for dysphoric mood and sleep disturbance  The patient is not nervous/anxious      Breast lump    Objective   Physical Exam  OBGyn Exam     Objective      /82 (BP Location: Left arm, Patient Position: Sitting, Cuff Size: Adult)   Wt 83 5 kg (184 lb)   BMI 33 65 kg/m²     General:   alert and oriented, in no acute distress   Neck: normal to inspection and palpation   Breast: Fibrocystic changes noted bilaterally  At the upper inner portion of the right breast at 230 near the sternal border, prominent band of fibrocystic change is noted somewhat asymmetric from the corresponding tissue on the left breast   No warmth or erythema or discharge are noted     Heart:    Lungs:    Abdomen: Soft, nontender, without masses   Vulva:    Vagina:    Cervix:    Uterus:    Adnexa:    Rectum:     Psych:  Normal mood and affect   Skin:  Without obvious lesions   Eyes: symmetric, with normal movements and reactivity   Musculoskeletal:  Normal muscle tone and movements appreciated

## 2021-08-13 NOTE — PATIENT INSTRUCTIONS
Fibrocystic Breast Changes   WHAT YOU NEED TO KNOW:   What are fibrocystic breast changes? Fibrocystic changes are changes in your breast tissue  Your breast tissue may have small cysts, benign lumps (not cancer), or thickened areas  These breast tissue changes are common in women who have not gone through menopause  Fibrocystic breast changes do not increase your risk of breast cancer  The cause of fibrocystic breast changes is unknown  They may be related to hormonal changes that occur during your menstrual cycle  What are other signs and symptoms of fibrocystic breast changes? Signs and symptoms may be more noticeable before your period  You may have any of the following:  · Tenderness and pain in the upper outer areas of both breasts    · Cysts that get larger and more painful before your period    · Breast swelling during your period    · Clear or cloudy nipple discharge    How are fibrocystic breast changes diagnosed? Your healthcare provider will examine your breasts  He or she will ask about your signs and symptoms, and about your family medical history  The provider may diagnose fibrocystic breast changes based on your signs and symptoms alone  He or she may also order certain tests to make sure you do not have breast cancer  You may need any of the following:  · A mammogram  is an x-ray to closely examine your breast tissue  Healthcare providers will also look for other lumps or tissue changes in your breast          · An ultrasound  uses sound waves to look for a cyst or tumor  · Aspiration and fine needle biopsy  is a procedure to find the cause of a breast lump  This procedure uses a small needle to collect fluid or cells from your breast  The samples are then sent to a lab  The drainage of fluid may also be done to help relieve pain  How are fibrocystic breast changes treated?   Your healthcare provider may recommend the following to relieve your symptoms:  · NSAIDs , such as ibuprofen, help decrease swelling, pain, and fever  This medicine is available with or without a doctor's order  NSAIDs can cause stomach bleeding or kidney problems in certain people  If you take blood thinner medicine, always ask your healthcare provider if NSAIDs are safe for you  Always read the medicine label and follow directions  · Oral contraceptives  (birth control pills) may be recommended by your healthcare provider  These may help to decrease the level of hormones that worsen your signs and symptoms  · Surgery  to remove a large lump or cysts that return after drainage may be done  How can I manage my symptoms? · Apply heat  on your breasts for 20 to 30 minutes every 2 hours for as many days as directed  Heat helps decrease pain and muscle spasms  · Apply ice  on your breasts for 15 to 20 minutes every hour or as directed  Use an ice pack, or put crushed ice in a plastic bag  Cover it with a towel  Ice helps prevent tissue damage and decreases swelling and pain  · Wear a well-fitted, supportive bra  It may help to relieve pain and swelling  · Limit or avoid caffeine  This may help to decrease symptoms in some women  Caffeine is found in coffee, tea, sodas, and chocolate  Why is it important to continue breast self-exams? Check your breast for changes in size, shape, or feel of the breast tissue  Check under your arms and all around your breasts  If you have monthly periods, examine your breasts after your period is over  Contact your healthcare provider if you notice any changes in your breasts  If you have questions, ask for more information about how to do a breast self-exam      When should I contact my healthcare provider? · You notice other changes in your breasts or nipples, such as dimpling or a rash  · You have bloody nipple discharge  · You have a fever  · You have questions or concerns about your condition or care      CARE AGREEMENT:   You have the right to help plan your care  Learn about your health condition and how it may be treated  Discuss treatment options with your healthcare providers to decide what care you want to receive  You always have the right to refuse treatment  The above information is an  only  It is not intended as medical advice for individual conditions or treatments  Talk to your doctor, nurse or pharmacist before following any medical regimen to see if it is safe and effective for you  © Copyright [employer] 2021 Information is for End User's use only and may not be sold, redistributed or otherwise used for commercial purposes   All illustrations and images included in CareNotes® are the copyrighted property of [employer]  or [address] [place] [address] [employer]

## 2021-09-01 ENCOUNTER — HOSPITAL ENCOUNTER (OUTPATIENT)
Dept: ULTRASOUND IMAGING | Facility: CLINIC | Age: 51
Discharge: HOME | End: 2021-09-01
Payer: COMMERCIAL

## 2021-09-01 ENCOUNTER — HOSPITAL ENCOUNTER (OUTPATIENT)
Dept: MAMMOGRAPHY | Facility: CLINIC | Age: 51
Discharge: HOME | End: 2021-09-01
Payer: COMMERCIAL

## 2021-09-01 VITALS — HEIGHT: 62 IN | WEIGHT: 184 LBS | BODY MASS INDEX: 33.86 KG/M2

## 2021-09-01 DIAGNOSIS — N63.10 BREAST MASS, RIGHT: ICD-10-CM

## 2021-09-01 PROCEDURE — 76642 ULTRASOUND BREAST LIMITED: CPT

## 2021-09-01 PROCEDURE — G0279 TOMOSYNTHESIS, MAMMO: HCPCS

## 2021-09-01 PROCEDURE — 77066 DX MAMMO INCL CAD BI: CPT

## 2022-05-18 ENCOUNTER — OFFICE VISIT (OUTPATIENT)
Dept: URGENT CARE | Facility: MEDICAL CENTER | Age: 52
End: 2022-05-18
Payer: COMMERCIAL

## 2022-05-18 ENCOUNTER — HOSPITAL ENCOUNTER (EMERGENCY)
Facility: HOSPITAL | Age: 52
Discharge: HOME | End: 2022-05-19
Attending: EMERGENCY MEDICINE | Admitting: EMERGENCY MEDICINE
Payer: COMMERCIAL

## 2022-05-18 VITALS
BODY MASS INDEX: 33.49 KG/M2 | HEIGHT: 62 IN | DIASTOLIC BLOOD PRESSURE: 115 MMHG | RESPIRATION RATE: 20 BRPM | TEMPERATURE: 98.9 F | HEART RATE: 90 BPM | SYSTOLIC BLOOD PRESSURE: 183 MMHG | OXYGEN SATURATION: 96 % | WEIGHT: 182 LBS

## 2022-05-18 DIAGNOSIS — R03.0 ELEVATED BLOOD PRESSURE READING: Primary | ICD-10-CM

## 2022-05-18 DIAGNOSIS — I16.0 HYPERTENSIVE URGENCY: Primary | ICD-10-CM

## 2022-05-18 LAB
BASOPHILS # BLD AUTO: 0.08 THOUSANDS/ΜL (ref 0–0.1)
BASOPHILS NFR BLD AUTO: 1 % (ref 0–1)
BILIRUB UR QL STRIP: NEGATIVE
CLARITY UR: CLEAR
CLARITY, POC: CLEAR
COLOR UR: YELLOW
COLOR, POC: YELLOW
EOSINOPHIL # BLD AUTO: 0.15 THOUSAND/ΜL (ref 0–0.61)
EOSINOPHIL NFR BLD AUTO: 2 % (ref 0–6)
ERYTHROCYTE [DISTWIDTH] IN BLOOD BY AUTOMATED COUNT: 12.7 % (ref 11.6–15.1)
GLUCOSE UR STRIP-MCNC: NEGATIVE MG/DL
HCT VFR BLD AUTO: 42.4 % (ref 34.8–46.1)
HGB BLD-MCNC: 14.4 G/DL (ref 11.5–15.4)
HGB UR QL STRIP.AUTO: ABNORMAL
IMM GRANULOCYTES # BLD AUTO: 0.02 THOUSAND/UL (ref 0–0.2)
IMM GRANULOCYTES NFR BLD AUTO: 0 % (ref 0–2)
KETONES UR STRIP-MCNC: NEGATIVE MG/DL
LEUKOCYTE ESTERASE UR QL STRIP: NEGATIVE
LYMPHOCYTES # BLD AUTO: 2.63 THOUSANDS/ΜL (ref 0.6–4.47)
LYMPHOCYTES NFR BLD AUTO: 27 % (ref 14–44)
MCH RBC QN AUTO: 30.3 PG (ref 26.8–34.3)
MCHC RBC AUTO-ENTMCNC: 34 G/DL (ref 31.4–37.4)
MCV RBC AUTO: 89 FL (ref 82–98)
MONOCYTES # BLD AUTO: 0.79 THOUSAND/ΜL (ref 0.17–1.22)
MONOCYTES NFR BLD AUTO: 8 % (ref 4–12)
NEUTROPHILS # BLD AUTO: 5.93 THOUSANDS/ΜL (ref 1.85–7.62)
NEUTS SEG NFR BLD AUTO: 62 % (ref 43–75)
NITRITE UR QL STRIP: NEGATIVE
NRBC BLD AUTO-RTO: 0 /100 WBCS
PH UR STRIP.AUTO: 5.5 [PH] (ref 4.5–8)
PLATELET # BLD AUTO: 282 THOUSANDS/UL (ref 149–390)
PMV BLD AUTO: 10 FL (ref 8.9–12.7)
PROT UR STRIP-MCNC: NEGATIVE MG/DL
RBC # BLD AUTO: 4.75 MILLION/UL (ref 3.81–5.12)
SP GR UR STRIP.AUTO: >=1.03 (ref 1–1.03)
UROBILINOGEN UR QL STRIP.AUTO: 0.2 E.U./DL
WBC # BLD AUTO: 9.6 THOUSAND/UL (ref 4.31–10.16)

## 2022-05-18 PROCEDURE — 96360 HYDRATION IV INFUSION INIT: CPT

## 2022-05-18 PROCEDURE — 80053 COMPREHEN METABOLIC PANEL: CPT | Performed by: EMERGENCY MEDICINE

## 2022-05-18 PROCEDURE — 81001 URINALYSIS AUTO W/SCOPE: CPT

## 2022-05-18 PROCEDURE — 85025 COMPLETE CBC W/AUTO DIFF WBC: CPT | Performed by: EMERGENCY MEDICINE

## 2022-05-18 PROCEDURE — 36415 COLL VENOUS BLD VENIPUNCTURE: CPT | Performed by: EMERGENCY MEDICINE

## 2022-05-18 PROCEDURE — 99284 EMERGENCY DEPT VISIT MOD MDM: CPT | Performed by: EMERGENCY MEDICINE

## 2022-05-18 PROCEDURE — 99283 EMERGENCY DEPT VISIT LOW MDM: CPT

## 2022-05-18 PROCEDURE — G0381 LEV 2 HOSP TYPE B ED VISIT: HCPCS | Performed by: EMERGENCY MEDICINE

## 2022-05-18 RX ADMIN — SODIUM CHLORIDE 1000 ML: 0.9 INJECTION, SOLUTION INTRAVENOUS at 23:36

## 2022-05-19 VITALS
HEART RATE: 71 BPM | OXYGEN SATURATION: 100 % | BODY MASS INDEX: 34.88 KG/M2 | TEMPERATURE: 98 F | RESPIRATION RATE: 18 BRPM | DIASTOLIC BLOOD PRESSURE: 79 MMHG | WEIGHT: 190.7 LBS | SYSTOLIC BLOOD PRESSURE: 156 MMHG

## 2022-05-19 LAB
ALBUMIN SERPL BCP-MCNC: 3.5 G/DL (ref 3.5–5)
ALP SERPL-CCNC: 103 U/L (ref 46–116)
ALT SERPL W P-5'-P-CCNC: 24 U/L (ref 12–78)
ANION GAP SERPL CALCULATED.3IONS-SCNC: 8 MMOL/L (ref 4–13)
AST SERPL W P-5'-P-CCNC: 41 U/L (ref 5–45)
BACTERIA UR QL AUTO: ABNORMAL /HPF
BILIRUB SERPL-MCNC: 0.34 MG/DL (ref 0.2–1)
BUN SERPL-MCNC: 13 MG/DL (ref 5–25)
CALCIUM SERPL-MCNC: 9.5 MG/DL (ref 8.3–10.1)
CHLORIDE SERPL-SCNC: 103 MMOL/L (ref 100–108)
CO2 SERPL-SCNC: 27 MMOL/L (ref 21–32)
CREAT SERPL-MCNC: 0.7 MG/DL (ref 0.6–1.3)
GFR SERPL CREATININE-BSD FRML MDRD: 102 ML/MIN/1.73SQ M
GLUCOSE SERPL-MCNC: 101 MG/DL (ref 65–140)
NON-SQ EPI CELLS URNS QL MICRO: ABNORMAL /HPF
POTASSIUM SERPL-SCNC: 4.9 MMOL/L (ref 3.5–5.3)
PROT SERPL-MCNC: 7.9 G/DL (ref 6.4–8.2)
RBC #/AREA URNS AUTO: ABNORMAL /HPF
SODIUM SERPL-SCNC: 138 MMOL/L (ref 136–145)
WBC #/AREA URNS AUTO: ABNORMAL /HPF

## 2022-05-19 NOTE — PROGRESS NOTES
[address] [facility]        NAME: Maribel Najera is a 54 y o  female  : 1970    MRN: [id number]  DATE: May 18, 2022  TIME: 9:00 PM    Assessment and Plan   Hypertensive urgency [I16 0]  1  Hypertensive urgency       9:01 PM  Patient with headache and blood pressure of 183/115  No history of high blood pressure  Neurological exam is normal   Recommend she go to the emergency department for further workup  Declined EMS  Has a friend to drive her  Patient Instructions       Go to the emergency department for evaluation of your headache and high blood pressure  Chief Complaint     Chief Complaint   Patient presents with    Headache     Headache onset Saturday but is now resolved  Feels foggy and ankles swollen today  Doesn't feel herself  Purchased home BP machine = 153/105         History of Present Illness       55 y/o female presents today for evaluation of elevated blood pressure  States she had a headache on Saturday that has since resolved but feels 'foggy' and not like herself  Reports swelling in bilateral ankles  Purchased a home blood pressure machine and states her home reading was 153/105  Denies chest pain or shortness of breath  Review of Systems   Review of Systems   Constitutional: Negative for chills, fatigue and fever  HENT: Negative for congestion, postnasal drip, sore throat and trouble swallowing  Eyes: Negative for visual disturbance  Respiratory: Negative for chest tightness and shortness of breath  Cardiovascular: Positive for leg swelling  Negative for chest pain  Gastrointestinal: Negative for abdominal pain  Genitourinary: Negative for dysuria  Skin: Negative for rash  Allergic/Immunologic: Negative for immunocompromised state  Neurological: Positive for headaches (have since resolved)  Negative for dizziness and light-headedness  Psychiatric/Behavioral: Negative for confusion           Current Medications       Current Outpatient Medications:     levonorgestrel (MIRENA) 20 MCG/24HR IUD, by Intrauterine route, Disp: , Rfl:     polyethylene glycol (MIRALAX) powder, Take by mouth (Patient not taking: Reported on 2021), Disp: , Rfl:     Current Allergies     Allergies as of 2022    (No Known Allergies)            The following portions of the patient's history were reviewed and updated as appropriate: allergies, current medications, past family history, past medical history, past social history, past surgical history and problem list      Past Medical History:   Diagnosis Date    Blood type, Rh negative     Fatty infiltration of liver     Human papilloma virus infection 2013    Pap negative with positive HPV       Past Surgical History:   Procedure Laterality Date     SECTION, LOW TRANSVERSE  2004    Breech presentation    OVARIAN CYST REMOVAL      REDUCTION MAMMAPLASTY Bilateral 2006    TONSILLECTOMY      TOOTH EXTRACTION         Family History   Problem Relation Age of Onset    Diabetes Father     Hypertension Father     Prostate cancer Father     Prostate cancer Family     Heart disease Family     Uterine cancer Maternal Grandmother     Melanoma Maternal Aunt     No Known Problems Maternal Grandfather     Heart attack Paternal Grandmother     Diabetes Paternal Grandmother     Prostate cancer Paternal Grandfather          Medications have been verified  Objective   BP (!) 183/115   Pulse 90   Temp 98 9 °F (37 2 °C)   Resp 20   Ht 5' 2" (1 575 m)   Wt 82 6 kg (182 lb)   SpO2 96%   BMI 33 29 kg/m²        Physical Exam     Physical Exam  Vitals and nursing note reviewed  Constitutional:       Appearance: Normal appearance  She is not ill-appearing  HENT:      Head: Normocephalic and atraumatic        Right Ear: Tympanic membrane, ear canal and external ear normal       Left Ear: Tympanic membrane, ear canal and external ear normal       Nose: Nose normal       Mouth/Throat: Mouth: Mucous membranes are moist    Eyes:      Extraocular Movements: Extraocular movements intact  Pupils: Pupils are equal, round, and reactive to light  Cardiovascular:      Rate and Rhythm: Normal rate and regular rhythm  Pulses: Normal pulses  Pulmonary:      Effort: Pulmonary effort is normal       Breath sounds: Normal breath sounds  Musculoskeletal:      Cervical back: Normal range of motion  Skin:     General: Skin is warm and dry  Capillary Refill: Capillary refill takes less than 2 seconds  Neurological:      General: No focal deficit present  Mental Status: She is alert and oriented to person, place, and time  Comments:  Alert and oriented to person, place, and time  GCS 15  CN II-XII intact  No facial asymmetry noted  Strong eye closure & symmetric brow raise  No nystagmus  Speech is clear and not slurred  No word finding issues  Ability to insufflate cheeks, protrude tongue midline & range this laterally  Symmetric/ intact sensation in the upper, mid & lower portions of the face   Finger to nose intact bilaterally  Strength equal upper extremities b/l  Strength equal in lower extremities b/l  Able to hold extremities against gravity x 10 seconds without drift x 4  No pronator drift    5/5 strength on head turn, shoulder shrug, bicep, tricep & deltoid movement against resistance b/l   5/5 strength on b/l hand , pincer grasp, finger abduction, dorsi & volar flexion, hip flexion, dorsi & plantar flexion  Symmetric grossly intact sensation in the UE & LE   Gait deferred   No dysmetria           Psychiatric:         Mood and Affect: Mood normal          Behavior: Behavior normal

## 2022-05-19 NOTE — ED PROVIDER NOTES
History  Chief Complaint   Patient presents with    Hypertension     Reports high BP at urgent care, sent for eval, denies HTN hx, denies CP       Patient presents for evaluation of elevated blood pressure  Patient went to urgent care earlier because she states that she has been feeling foggy and not well today  Denies any chest pain, shortness of breath, fevers, chills but does state that she does have a lot of stress and is getting ready for an upcoming trip  States that she had a headache a few days ago but none today  Not complaining of any neurologic problems  Patient took her blood pressure at home and it was elevated  Patient discussed this with a friend who is a nurse who advised her to get evaluated  Patient went to urgent care and had it retested  It was 183/115 and she was sent here for hypertensive urgency  History provided by:  Patient and spouse   used: No    Hypertension  Severity:  Severe  Onset quality:  Unable to specify  Chronicity:  New  Notable PTA blood pressures:  183/115  Context: stress    Context: not drug abuse and not OTC medications used    Relieved by:  Nothing  Ineffective treatments:  Rest  Associated symptoms: no abdominal pain, no blurred vision, no chest pain, no dizziness, no ear pain, no epistaxis, no fever, no headaches, no hematuria, no loss of consciousness, no nausea, no palpitations, no shortness of breath, not vomiting and no weakness    Risk factors: no cardiac disease, no cocaine use, no decongestant use and no prior stroke        Prior to Admission Medications   Prescriptions Last Dose Informant Patient Reported?  Taking?   levonorgestrel (MIRENA) 20 MCG/24HR IUD   Yes No   Sig: by Intrauterine route   polyethylene glycol (MIRALAX) powder   Yes No   Sig: Take by mouth   Patient not taking: Reported on 8/13/2021      Facility-Administered Medications: None       Past Medical History:   Diagnosis Date    Blood type, Rh negative     Fatty infiltration of liver     Human papilloma virus infection 2013    Pap negative with positive HPV       Past Surgical History:   Procedure Laterality Date     SECTION, LOW TRANSVERSE  2004    Breech presentation    OVARIAN CYST REMOVAL      REDUCTION MAMMAPLASTY Bilateral 2006    TONSILLECTOMY      TOOTH EXTRACTION         Family History   Problem Relation Age of Onset    Diabetes Father     Hypertension Father     Prostate cancer Father     Prostate cancer Family     Heart disease Family     Uterine cancer Maternal Grandmother     Melanoma Maternal Aunt     No Known Problems Maternal Grandfather     Heart attack Paternal Grandmother     Diabetes Paternal Grandmother     Prostate cancer Paternal Grandfather      I have reviewed and agree with the history as documented  E-Cigarette/Vaping    E-Cigarette Use Never User      E-Cigarette/Vaping Substances    Nicotine No     THC No     CBD No     Flavoring No     Other No     Unknown No      Social History     Tobacco Use    Smoking status: Never Smoker    Smokeless tobacco: Never Used   Vaping Use    Vaping Use: Never used   Substance Use Topics    Alcohol use: Yes     Comment: Socially    Drug use: No       Review of Systems   Constitutional: Negative for activity change, appetite change, chills and fever  HENT: Negative for congestion, ear pain, facial swelling, nosebleeds, rhinorrhea and sore throat  Eyes: Negative for blurred vision and visual disturbance  Respiratory: Negative for cough and shortness of breath  Cardiovascular: Negative for chest pain and palpitations  Gastrointestinal: Negative for abdominal pain, nausea and vomiting  Genitourinary: Negative for dysuria and hematuria  Musculoskeletal: Negative for arthralgias and back pain  Skin: Negative for color change and rash  Allergic/Immunologic: Negative for immunocompromised state     Neurological: Negative for dizziness, tremors, seizures, loss of consciousness, syncope, speech difficulty, weakness and headaches  All other systems reviewed and are negative  Physical Exam  Physical Exam  Vitals and nursing note reviewed  Constitutional:       General: She is not in acute distress  Appearance: She is well-developed  She is not ill-appearing, toxic-appearing or diaphoretic  HENT:      Head: Normocephalic and atraumatic  Right Ear: External ear normal       Left Ear: External ear normal       Nose: Nose normal  No congestion or rhinorrhea  Eyes:      General: No scleral icterus  Right eye: No discharge  Left eye: No discharge  Conjunctiva/sclera: Conjunctivae normal       Pupils: Pupils are equal, round, and reactive to light  Neck:      Trachea: No tracheal deviation  Cardiovascular:      Rate and Rhythm: Normal rate and regular rhythm  Heart sounds: Normal heart sounds  No murmur heard  No friction rub  Pulmonary:      Effort: Pulmonary effort is normal  No tachypnea, accessory muscle usage or respiratory distress  Breath sounds: Normal breath sounds  No stridor  No wheezing or rales  Abdominal:      General: There is no distension  Palpations: Abdomen is soft  Tenderness: There is no abdominal tenderness  There is no guarding or rebound  Musculoskeletal:         General: No tenderness or deformity  Normal range of motion  Cervical back: Normal range of motion and neck supple  Skin:     General: Skin is warm and dry  Neurological:      General: No focal deficit present  Mental Status: She is alert and oriented to person, place, and time  She is not disoriented        Gait: Gait normal    Psychiatric:         Speech: Speech normal          Behavior: Behavior normal          Vital Signs  ED Triage Vitals   Temperature Pulse Respirations Blood Pressure SpO2   05/18/22 2124 05/18/22 2125 05/18/22 2125 05/18/22 2125 05/18/22 2125   98 °F (36 7 °C) 87 20 (!) 191/94 99 %      Temp src Heart Rate Source Patient Position - Orthostatic VS BP Location FiO2 (%)   -- -- -- -- --             Pain Score       --                  Vitals:    05/18/22 2125 05/18/22 2314 05/19/22 0030   BP: (!) 191/94 (!) 191/96 156/79   Pulse: 87  71         Visual Acuity      ED Medications  Medications   sodium chloride 0 9 % bolus 1,000 mL (0 mL Intravenous Stopped 5/19/22 0036)       Diagnostic Studies  Results Reviewed     Procedure Component Value Units Date/Time    Urine Microscopic [[id number]]  (Abnormal) Collected: 05/18/22 2348    Lab Status: Final result Specimen: Urine, Clean Catch Updated: 05/19/22 0202     RBC, UA 2-4 /hpf      WBC, UA 1-2 /hpf      Epithelial Cells Moderate /hpf      Bacteria, UA Occasional /hpf     Comprehensive metabolic panel [[id number]] Collected: 05/18/22 2338    Lab Status: Final result Specimen: Blood from Arm, Left Updated: 05/19/22 0011     Sodium 138 mmol/L      Potassium 4 9 mmol/L      Chloride 103 mmol/L      CO2 27 mmol/L      ANION GAP 8 mmol/L      BUN 13 mg/dL      Creatinine 0 70 mg/dL      Glucose 101 mg/dL      Calcium 9 5 mg/dL      AST 41 U/L      ALT 24 U/L      Alkaline Phosphatase 103 U/L      Total Protein 7 9 g/dL      Albumin 3 5 g/dL      Total Bilirubin 0 34 mg/dL      eGFR 102 ml/min/1 73sq m     Narrative:      [place] guidelines for Chronic Kidney Disease (CKD):     Stage 1 with normal or high GFR (GFR > 90 mL/min/1 73 square meters)    Stage 2 Mild CKD (GFR = 60-89 mL/min/1 73 square meters)    Stage 3A Moderate CKD (GFR = 45-59 mL/min/1 73 square meters)    Stage 3B Moderate CKD (GFR = 30-44 mL/min/1 73 square meters)    Stage 4 Severe CKD (GFR = 15-29 mL/min/1 73 square meters)    Stage 5 End Stage CKD (GFR <15 mL/min/1 73 square meters)  Note: GFR calculation is accurate only with a steady state creatinine    CBC and differential [[id number]] Collected: 05/18/22 2338    Lab Status: Final result Specimen: Blood from Arm, Left Updated: 05/18/22 2355     WBC 9 60 Thousand/uL      RBC 4 75 Million/uL      Hemoglobin 14 4 g/dL      Hematocrit 42 4 %      MCV 89 fL      MCH 30 3 pg      MCHC 34 0 g/dL      RDW 12 7 %      MPV 10 0 fL      Platelets 117 Thousands/uL      nRBC 0 /100 WBCs      Neutrophils Relative 62 %      Immat GRANS % 0 %      Lymphocytes Relative 27 %      Monocytes Relative 8 %      Eosinophils Relative 2 %      Basophils Relative 1 %      Neutrophils Absolute 5 93 Thousands/µL      Immature Grans Absolute 0 02 Thousand/uL      Lymphocytes Absolute 2 63 Thousands/µL      Monocytes Absolute 0 79 Thousand/µL      Eosinophils Absolute 0 15 Thousand/µL      Basophils Absolute 0 08 Thousands/µL     POCT urinalysis dipstick [[id number]]  (Normal) Resulted: 05/18/22 2353    Lab Status: Final result Specimen: Urine Updated: 05/18/22 2353     Color, UA yellow     Clarity, UA clear    Urine Macroscopic, POC [[id number]]  (Abnormal) Collected: 05/18/22 2348    Lab Status: Final result Specimen: Urine Updated: 05/18/22 2349     Color, UA Yellow     Clarity, UA Clear     pH, UA 5 5     Leukocytes, UA Negative     Nitrite, UA Negative     Protein, UA Negative mg/dl      Glucose, UA Negative mg/dl      Ketones, UA Negative mg/dl      Urobilinogen, UA 0 2 E U /dl      Bilirubin, UA Negative     Blood, UA Small     Specific [place], UA >=1 030    Narrative:      [facility] RESULT                 No orders to display              Procedures  Procedures         ED Course                                             MDM  Number of Diagnoses or Management Options  Elevated blood pressure reading: new and requires workup  Diagnosis management comments: Patient appears well on exam   She has no neurologic deficits  Overall has no complaints at this time  Patient sent from urgent care for this elevated blood pressure  She tells me that she had a headache a few days ago but does not have 1 now    States that she felt somewhat foggy, has been stressed a lot and did have a very salty meal a few days ago  Patient not on any medications and has no known medical problems  We did repeat blood pressure here in it is elevated in the 190s over 90s  She is neurologically intact so I do not feel that imaging is needed at this time  She has no chest pain or shortness of breath  I will check basic labs, urinalysis for protein and hydrate with IV fluids, let her rest and relax and recheck  Possible underlying dehydration from her being stressed, not eating well recently because of a trip that is coming up and having a very salty meal the other day  I do not feel that she needs a full cardiac workup at this time because she has no chest pain or shortness of breath  She does have a PCP that she will be following up with  Talked to her and her  at length about workup for this and treatment  We talked about how aggressive treatment can actually lead to harm and cause hypoperfusion and stroke  Since she is neurologically intact without deficits so imaging is not indicated at this time  Explained that unless she has evidence of kidney dysfunction then we would hold treatment and have her follow-up with her PCP  Patient understands and agrees with this plan of care  Will re-evaluate after labs and fluids  12:33 AM  Workup is negative  Blood pressure trending down to 156/79 with IV fluids  At this point I will continue with current plan of care which is discharge, outpatient follow-up with PCP, continuing hydration, increase exercise, decrease stress and return to the ER if any other signs or symptoms of hypertensive urgency present  We did discuss these  Patient understands and agrees with this plan of care  Questions and concerns answered         Amount and/or Complexity of Data Reviewed  Clinical lab tests: ordered and reviewed  Tests in the medicine section of CPT®: reviewed and ordered  Review and summarize past medical records: yes    Patient Progress  Patient progress: improved      Disposition  Final diagnoses:   Elevated blood pressure reading     Time reflects when diagnosis was documented in both MDM as applicable and the Disposition within this note     Time User Action Codes Description Comment    5/19/2022 12:36 AM Ale Vega Add [R03 0] Elevated blood pressure reading       ED Disposition     ED Disposition   Discharge    Condition   Stable    Date/Time   Thu May 19, 2022 12:34 AM    Comment   Unique Gabriel discharge to home/self care  Follow-up Information     Follow up With Specialties Details Why Loretta [address], DO Family Medicine Call today To schedule an appointment as soon as you can [address] [place] [address]  [phone]            Discharge Medication List as of 5/19/2022 12:36 AM      CONTINUE these medications which have NOT CHANGED    Details   levonorgestrel (MIRENA) 20 MCG/24HR IUD by Intrauterine route, Starting Thu 1/8/2015, Historical Med      polyethylene glycol (MIRALAX) powder Take by mouth, Starting Wed 2/12/2014, Historical Med             No discharge procedures on file      PDMP Review     None          ED Provider  Electronically Signed by           Magaly Rebolledo ,   05/19/22 [address] , DO  05/24/22 [address] , DO  05/24/22 2233

## 2023-05-18 ENCOUNTER — ANNUAL EXAM (OUTPATIENT)
Dept: GYNECOLOGY | Facility: CLINIC | Age: 53
End: 2023-05-18

## 2023-05-18 VITALS
SYSTOLIC BLOOD PRESSURE: 140 MMHG | HEIGHT: 62 IN | WEIGHT: 189 LBS | BODY MASS INDEX: 34.78 KG/M2 | DIASTOLIC BLOOD PRESSURE: 80 MMHG

## 2023-05-18 DIAGNOSIS — Z97.5 IUD (INTRAUTERINE DEVICE) IN PLACE: ICD-10-CM

## 2023-05-18 DIAGNOSIS — Z12.31 VISIT FOR SCREENING MAMMOGRAM: ICD-10-CM

## 2023-05-18 DIAGNOSIS — Z01.419 WOMEN'S ANNUAL ROUTINE GYNECOLOGICAL EXAMINATION: ICD-10-CM

## 2023-05-18 DIAGNOSIS — Z01.419 ENCOUNTER FOR ANNUAL ROUTINE GYNECOLOGICAL EXAMINATION: Primary | ICD-10-CM

## 2023-05-18 DIAGNOSIS — N95.1 MENOPAUSAL SYMPTOMS: ICD-10-CM

## 2023-05-18 DIAGNOSIS — N91.4 SECONDARY OLIGOMENORRHEA: ICD-10-CM

## 2023-05-18 DIAGNOSIS — Z11.51 SCREENING FOR HPV (HUMAN PAPILLOMAVIRUS): ICD-10-CM

## 2023-05-18 NOTE — PROGRESS NOTES
Assessment/Plan   Diagnoses and all orders for this visit:    Women's annual routine gynecological examination    Visit for screening mammogram  -     Mammo screening bilateral w 3d & cad; Future    IUD (intrauterine device) in place    Secondary oligomenorrhea  -     Follicle stimulating hormone; Future  -     Estradiol; Future    Menopausal symptoms  -     Follicle stimulating hormone; Future  -     Estradiol; Future    1  yearly exam-Pap smear done with HPV testing, self breast awareness reviewed, calcium/vitamin D recommendations discussed, mammogram request given, colonoscopy follow-up as per specialist   2   Mirena IUD- status post removal with reinsertion on 1/2/2020  It was a challenging reinsertion, required third attempt with second IUD at that time  Previous ultrasound demonstrated IUD in the lower uterine segment on that date  Most recent ultrasound demonstrated IUD 3 6 cm from the fundus area  She has had no issues with it  IUD string was seen today at a length of 2 to 2 5 cm  She has essentially amenorrhea with rare spotting with exercise about once per year  She will continue with the IUD at this time  3  perimenopause/possible menopause- notes essential amenorrhea as noted above, may be related to Mirena IUD  She does note intermittent night sweats and hot flushes but not severe  To check [address] and estradiol  4  history of irregular bleeding-denies complaints at this time  5  prior history HPV- recent Pap/HPV negative from 5/11/2017  Repeat Pap with HPV done today, disposition as per findings  6   Prior history of right ovarian cyst-resolved on most recent ultrasound from 1/23/2020  7  other-cervix is quite high in the vagina with some kinking noted  Consider misoprostol if any procedures required  8   Gastrointestinal-does note stomach/abdominal bloating and distention 24/7 by patient report  She does have a history of IBS, irritable bowel, diverticulitis    Was followed by   Jonathan but he retired  She is being seen by primary doctor, has abdominal ultrasound scheduled [facility] on 2023  Encouraged her to take one of my cards and asked them to send me a copy of the report  I will try to track it down as well independently  Depending on their field-of-view, she may return in the near future for pelvic ultrasound and office visit with me  She is considering removal of the IUD in an attempt to help with these symptoms if no other etiology is found  We will proceed accordingly  Otherwise, follow-up 1 year for yearly exam or as needed  Subjective   Patient ID: Vance Hyman is a 50 y o  female  Vitals:    23 0716   BP: 140/80     Patient was seen today for yearly exam   Please see assessment plan for details        The following portions of the patient's history were reviewed and updated as appropriate: allergies, current medications, past family history, past medical history, past social history, past surgical history and problem list   Past Medical History:   Diagnosis Date   • Blood type, Rh negative    • Fatty infiltration of liver    • Human papilloma virus infection 2013    Pap negative with positive HPV   • Hypertension 2022    Er visit     Past Surgical History:   Procedure Laterality Date   •  SECTION, LOW TRANSVERSE  2004    Breech presentation   • OVARIAN CYST REMOVAL     • REDUCTION MAMMAPLASTY Bilateral 2006   • TONSILLECTOMY     • TOOTH EXTRACTION       OB History    Para Term  AB Living   2 1 1   1 1   SAB IAB Ectopic Multiple Live Births           1      # Outcome Date GA Lbr Juan/2nd Weight Sex Delivery Anes PTL Lv   2 AB            1 Term               Obstetric Comments   Breech birth   Spontaneous        Current Outpatient Medications:   •  levonorgestrel (MIRENA) 20 MCG/24HR IUD, by Intrauterine route, Disp: , Rfl:   •  polyethylene glycol (MIRALAX) powder, Take by mouth (Patient not taking: Reported on 8/13/2021), Disp: , Rfl:   No Known Allergies  Social History     Socioeconomic History   • Marital status:      Spouse name: Not on file   • Number of children: 1   • Years of education: Not on file   • Highest education level: Not on file   Occupational History   • Not on file   Tobacco Use   • Smoking status: Never   • Smokeless tobacco: Never   Vaping Use   • Vaping Use: Never used   Substance and Sexual Activity   • Alcohol use: Yes     Comment: Socially   • Drug use: No   • Sexual activity: Yes     Partners: Male     Birth control/protection: I U D  Other Topics Concern   • Not on file   Social History Narrative    Caffeine use    Sikhism affiliation:  [employer] [id number] [address]    Uses safety equipment - seatbelts     Social Determinants of Health     Financial Resource Strain: Not on file   Food Insecurity: Not on file   Transportation Needs: Not on file   Physical Activity: Not on file   Stress: Not on file   Social Connections: Not on file   Intimate Partner Violence: Not on file   Housing Stability: Not on file     Family History   Problem Relation Age of Onset   • Diabetes Father    • Hypertension Father    • Prostate cancer Father    • Prostate cancer Family    • Heart disease Family    • Uterine cancer Maternal Grandmother    • Ovarian cancer Maternal Grandmother         Grandma   • Melanoma Maternal Aunt    • Heart attack Maternal Grandfather         Passes away 1975   • Heart attack Paternal Grandmother    • Diabetes Paternal Grandmother    • Deep vein thrombosis Paternal Grandmother         Diabetic   • Prostate cancer Paternal Grandfather        Review of Systems   Constitutional: Negative for chills, diaphoresis, fatigue and fever  Respiratory: Negative for apnea, cough, chest tightness, shortness of breath and wheezing  Cardiovascular: Negative for chest pain, palpitations and leg swelling     Gastrointestinal: Negative for abdominal distention, abdominal pain, anal "bleeding, constipation, diarrhea, nausea, rectal pain and vomiting  Genitourinary: Negative for difficulty urinating, dyspareunia, dysuria, frequency, hematuria, menstrual problem, pelvic pain, urgency, vaginal bleeding, vaginal discharge and vaginal pain  Musculoskeletal: Negative for arthralgias, back pain and myalgias  Skin: Negative for color change and rash  Neurological: Negative for dizziness, syncope, light-headedness, numbness and headaches  Hematological: Negative for adenopathy  Does not bruise/bleed easily  Psychiatric/Behavioral: Negative for dysphoric mood and sleep disturbance  The patient is not nervous/anxious  Objective   Physical Exam  OBGyn Exam     Objective      /80   Ht 5' 1 5\" (1 562 m)   Wt 85 7 kg (189 lb)   BMI 35 13 kg/m²     General:   alert and oriented, in no acute distress   Neck: normal to inspection and palpation   Breast: normal appearance, no masses or tenderness   Heart:    Lungs:    Abdomen: soft, non-tender, without masses or organomegaly   Vulva: normal   Vagina: Without erythema or lesions or discharge  Normal   Cervix: Without lesions or discharge or cervicitis  No Cervical motion tenderness  IUD string seen at a length of 2 to 2 5 cm     Uterus: top normal size, anteverted, non-tender   Adnexa: no mass, fullness, tenderness   Rectum: negative    Psych:  Normal mood and affect   Skin:  Without obvious lesions   Eyes: symmetric, with normal movements and reactivity   Musculoskeletal:  Normal muscle tone and movements appreciated       "

## 2023-05-18 NOTE — Clinical Note
Check abdominal ultrasound 5/25/2023 at [facility]  May need ultrasound and office visit with me after this depending on their visualization

## 2023-05-22 LAB
HPV HR 12 DNA CVX QL NAA+PROBE: NEGATIVE
HPV16 DNA CVX QL NAA+PROBE: NEGATIVE
HPV18 DNA CVX QL NAA+PROBE: NEGATIVE

## 2023-05-26 LAB
LAB AP GYN PRIMARY INTERPRETATION: NORMAL
Lab: NORMAL
PATH INTERP SPEC-IMP: NORMAL

## 2024-06-06 ENCOUNTER — ANNUAL EXAM (OUTPATIENT)
Dept: GYNECOLOGY | Facility: CLINIC | Age: 54
End: 2024-06-06
Payer: COMMERCIAL

## 2024-06-06 VITALS
HEIGHT: 62 IN | SYSTOLIC BLOOD PRESSURE: 116 MMHG | WEIGHT: 187.2 LBS | BODY MASS INDEX: 34.45 KG/M2 | DIASTOLIC BLOOD PRESSURE: 74 MMHG

## 2024-06-06 DIAGNOSIS — N91.4 SECONDARY OLIGOMENORRHEA: ICD-10-CM

## 2024-06-06 DIAGNOSIS — Z97.5 IUD (INTRAUTERINE DEVICE) IN PLACE: ICD-10-CM

## 2024-06-06 DIAGNOSIS — Z01.419 WOMEN'S ANNUAL ROUTINE GYNECOLOGICAL EXAMINATION: Primary | ICD-10-CM

## 2024-06-06 DIAGNOSIS — Z12.31 ENCOUNTER FOR SCREENING MAMMOGRAM FOR BREAST CANCER: ICD-10-CM

## 2024-06-06 PROBLEM — N95.1 MENOPAUSAL SYMPTOMS: Status: ACTIVE | Noted: 2024-06-06

## 2024-06-06 PROCEDURE — 99396 PREV VISIT EST AGE 40-64: CPT | Performed by: OBSTETRICS & GYNECOLOGY

## 2024-06-06 NOTE — PROGRESS NOTES
Assessment & Plan   Diagnoses and all orders for this visit:    Women's annual routine gynecological examination    Encounter for screening mammogram for breast cancer  -     Mammo screening bilateral w 3d & cad; Future    IUD (intrauterine device) in place    Secondary oligomenorrhea    1. yearly exam-Pap smear deferred, self breast awareness reviewed, calcium/vitamin D recommendations discussed, mammogram request given, colonoscopy follow-up as per [facility].  Dr. Castillo retired, she is planning to see another doctor as scheduled in the next few months.  2. Mirena IUD-status post removal with reinsertion on 1/2/2020.  It was a difficult insertion requiring second IUD with third attempt.  Ultrasound demonstrated the IUD in the lower uterine segment on that date, most recent ultrasound was 3.6 cm from the fundus.  IUD string continues to be 2 to 2.5 cm.  Patient notes amenorrhea since a few weeks/months after insertion was placed.  She is very happy.  We will continue to leave it with planned removal January 2028 or as needed.  3.  Menopausal symptoms-does note them but not severe.  Practical recommendations were reviewed.  FSH and estradiol ordered previously, not done.  Will continue to follow clinically  4.  Secondary amenorrhea-IUD versus possible menopause/perimenopause  5.  Prior history of HPV-Pap with HPV was negative from 5/18/2023.  Pap was deferred today as per current guidelines with patient agreement.    6. prior history of right ovarian cyst-resolved on most recent ultrasound 1/23/2020  7. other-cervix is quite high in the vagina with some kinking noted.  Consider misoprostol if any procedures required  8. gastrointestinal-continues with significant bloating.  Has IBS and diverticulitis.  Follow-up as per [facility].  9. other-son is now age 20.  She does have rectus diastases.  Follow-up 1 year for yearly exam or as needed.    Subjective   Patient ID: Paulo Carreno is a 53 y.o. female.    Vitals:     24 1317   BP: 116/74     Patient was seen today for yearly exam.  Please see assessment plan for details.      The following portions of the patient's history were reviewed and updated as appropriate: allergies, current medications, past family history, past medical history, past social history, past surgical history, and problem list.  Past Medical History:   Diagnosis Date    Blood type, Rh negative     Fatty infiltration of liver     Human papilloma virus infection 2013    Pap negative with positive HPV    Hypertension 2022    [facility] visit     Past Surgical History:   Procedure Laterality Date     SECTION, LOW TRANSVERSE  2004    Breech presentation    OVARIAN CYST REMOVAL      REDUCTION MAMMAPLASTY Bilateral 2006    TONSILLECTOMY      TOOTH EXTRACTION       OB History    Para Term  AB Living   2 1 1   1 1   SAB IAB Ectopic Multiple Live Births           1      # Outcome Date GA Lbr Juan/2nd Weight Sex Type Anes PTL Lv   2 AB            1 Term               Obstetric Comments   Breech birth   Spontaneous        Current Outpatient Medications:     levonorgestrel (MIRENA) 20 MCG/24HR IUD, by Intrauterine route, Disp: , Rfl:     polyethylene glycol (MIRALAX) powder, Take by mouth (Patient not taking: Reported on 2021), Disp: , Rfl:   No Known Allergies  Social History     Socioeconomic History    Marital status:      Spouse name: None    Number of children: 1    Years of education: None    Highest education level: None   Occupational History    None   Tobacco Use    Smoking status: Never    Smokeless tobacco: Never   Vaping Use    Vaping status: Never Used   Substance and Sexual Activity    Alcohol use: Yes     Comment: Socially    Drug use: No    Sexual activity: Yes     Partners: Male     Birth control/protection: I.U.D.   Other Topics Concern    None   Social History Narrative    Caffeine use    Sabianism affiliation:  [facility]    Uses  "safety equipment - seatbelts     Social Determinants of Health     Financial Resource Strain: Not on file   Food Insecurity: Not on file   Transportation Needs: Not on file   Physical Activity: Not on file   Stress: Not on file   Social Connections: Not on file   Intimate Partner Violence: Not on file   Housing Stability: Not on file     Family History   Problem Relation Age of Onset    Diabetes Father     Hypertension Father     Prostate cancer Father     Prostate cancer Family     Heart disease Family     Uterine cancer Maternal Grandmother     Ovarian cancer Maternal Grandmother         Grandma    Melanoma Maternal Aunt     Heart attack Maternal Grandfather         Passes away 1975    Heart attack Paternal Grandmother     Diabetes Paternal Grandmother     Deep vein thrombosis Paternal Grandmother         Diabetic    Prostate cancer Paternal Grandfather        Review of Systems    Objective   Physical Exam  OBGyn Exam     Objective      /74 (BP Location: Right arm, Patient Position: Sitting)   Ht 5' 2\" (1.575 m)   Wt 84.9 kg (187 lb 3.2 oz)   BMI 34.24 kg/m²     General:   alert and oriented, in no acute distress   Neck: normal to inspection and palpation   Breast: normal appearance, no masses or tenderness   Heart:    Lungs:    Abdomen: soft, non-tender, without masses or organomegaly   Vulva: normal   Vagina: Without erythema or lesions or discharge.  Normal   Cervix: Without lesions or discharge or cervicitis.  No Cervical motion tenderness.  IUD strings seen at a length of 2 to 2.5 cm.  Cervix is high in the vagina, points posteriorly   Uterus: top normal size, anteverted, non-tender   Adnexa: no mass, fullness, tenderness   Rectum: Deferred, patient declined.  External hemorrhoid noted    Psych:  Normal mood and affect   Skin:  Without obvious lesions   Eyes: symmetric, with normal movements and reactivity   Musculoskeletal:  Normal muscle tone and movements appreciated       "